# Patient Record
Sex: FEMALE | Race: WHITE | NOT HISPANIC OR LATINO | Employment: FULL TIME
[De-identification: names, ages, dates, MRNs, and addresses within clinical notes are randomized per-mention and may not be internally consistent; named-entity substitution may affect disease eponyms.]

---

## 2017-03-21 ENCOUNTER — RECORDS - HEALTHEAST (OUTPATIENT)
Dept: ADMINISTRATIVE | Facility: OTHER | Age: 47
End: 2017-03-21

## 2017-04-05 ENCOUNTER — HOSPITAL ENCOUNTER (OUTPATIENT)
Dept: MRI IMAGING | Facility: CLINIC | Age: 47
Discharge: HOME | End: 2017-04-05

## 2017-04-05 ENCOUNTER — COMMUNICATION - HEALTHEAST (OUTPATIENT)
Dept: TELEHEALTH | Facility: CLINIC | Age: 47
End: 2017-04-05

## 2017-04-05 DIAGNOSIS — G89.29 CHRONIC HEADACHES: ICD-10-CM

## 2017-04-05 DIAGNOSIS — R51.9 CHRONIC HEADACHES: ICD-10-CM

## 2018-02-27 ENCOUNTER — HOSPITAL ENCOUNTER (OUTPATIENT)
Dept: MAMMOGRAPHY | Facility: HOSPITAL | Age: 48
Discharge: HOME | End: 2018-02-27

## 2018-02-27 DIAGNOSIS — Z12.31 VISIT FOR SCREENING MAMMOGRAM: ICD-10-CM

## 2018-03-27 ENCOUNTER — HOSPITAL ENCOUNTER (OUTPATIENT)
Dept: PHYSICAL MEDICINE AND REHAB | Facility: CLINIC | Age: 48
Discharge: HOME | End: 2018-03-27
Attending: PHYSICAL MEDICINE & REHABILITATION

## 2018-03-27 DIAGNOSIS — M79.18 MYOFASCIAL PAIN: ICD-10-CM

## 2018-03-27 DIAGNOSIS — M99.03 LUMBAR REGION SOMATIC DYSFUNCTION: ICD-10-CM

## 2018-03-27 DIAGNOSIS — M99.05 SOMATIC DYSFUNCTION OF PELVIS REGION: ICD-10-CM

## 2018-03-27 DIAGNOSIS — M99.01 CERVICAL SOMATIC DYSFUNCTION: ICD-10-CM

## 2018-03-27 DIAGNOSIS — M99.04 SACRAL REGION SOMATIC DYSFUNCTION: ICD-10-CM

## 2018-03-27 DIAGNOSIS — M99.02 THORACIC REGION SOMATIC DYSFUNCTION: ICD-10-CM

## 2018-03-27 DIAGNOSIS — M54.2 NECK PAIN: ICD-10-CM

## 2018-03-27 DIAGNOSIS — S16.1XXA NECK STRAIN: ICD-10-CM

## 2018-03-27 DIAGNOSIS — M99.08 RIB CAGE REGION SOMATIC DYSFUNCTION: ICD-10-CM

## 2018-03-27 DIAGNOSIS — M99.00 HEAD REGION SOMATIC DYSFUNCTION: ICD-10-CM

## 2018-03-27 RX ORDER — VERAPAMIL HYDROCHLORIDE 180 MG/1
CAPSULE, EXTENDED RELEASE ORAL
Status: SHIPPED | COMMUNITY
Start: 2018-03-12

## 2018-03-27 RX ORDER — METHOCARBAMOL 500 MG/1
TABLET, FILM COATED ORAL
Qty: 60 TABLET | Refills: 3 | Status: SHIPPED | OUTPATIENT
Start: 2018-03-27

## 2018-03-27 RX ORDER — SIMVASTATIN 20 MG
TABLET ORAL
Status: SHIPPED | COMMUNITY
Start: 2018-03-12

## 2018-03-27 ASSESSMENT — MIFFLIN-ST. JEOR: SCORE: 1532.08

## 2018-03-29 ENCOUNTER — COMMUNICATION - HEALTHEAST (OUTPATIENT)
Dept: PHYSICAL MEDICINE AND REHAB | Facility: CLINIC | Age: 48
End: 2018-03-29

## 2018-04-03 ENCOUNTER — COMMUNICATION - HEALTHEAST (OUTPATIENT)
Dept: PHYSICAL MEDICINE AND REHAB | Facility: CLINIC | Age: 48
End: 2018-04-03

## 2019-01-23 ENCOUNTER — HOSPITAL ENCOUNTER (OUTPATIENT)
Dept: PHYSICAL MEDICINE AND REHAB | Facility: CLINIC | Age: 49
Discharge: HOME | End: 2019-01-23
Attending: PHYSICAL MEDICINE & REHABILITATION

## 2019-01-23 ENCOUNTER — COMMUNICATION - HEALTHEAST (OUTPATIENT)
Dept: PHYSICAL MEDICINE AND REHAB | Facility: CLINIC | Age: 49
End: 2019-01-23

## 2019-01-23 DIAGNOSIS — M54.2 NECK PAIN: ICD-10-CM

## 2019-01-23 DIAGNOSIS — M99.01 SOMATIC DYSFUNCTION OF CERVICAL REGION: ICD-10-CM

## 2019-01-23 DIAGNOSIS — M79.18 MYOFASCIAL PAIN: ICD-10-CM

## 2019-01-23 DIAGNOSIS — M99.07 SOMATIC DYSFUNCTION OF UPPER EXTREMITY: ICD-10-CM

## 2019-01-23 DIAGNOSIS — M99.00 SOMATIC DYSFUNCTION OF HEAD REGION: ICD-10-CM

## 2019-01-23 DIAGNOSIS — M54.6 THORACIC SPINE PAIN: ICD-10-CM

## 2019-01-23 DIAGNOSIS — M99.08 SOMATIC DYSFUNCTION OF RIB REGION: ICD-10-CM

## 2019-01-23 DIAGNOSIS — G47.9 SLEEP DIFFICULTIES: ICD-10-CM

## 2019-01-23 DIAGNOSIS — M99.02 SOMATIC DYSFUNCTION OF THORACIC REGION: ICD-10-CM

## 2019-01-23 RX ORDER — TRAZODONE HYDROCHLORIDE 50 MG/1
TABLET, FILM COATED ORAL
Status: SHIPPED | COMMUNITY
Start: 2019-01-03

## 2019-01-23 ASSESSMENT — MIFFLIN-ST. JEOR: SCORE: 1538.84

## 2019-06-04 ENCOUNTER — COMMUNICATION - HEALTHEAST (OUTPATIENT)
Dept: PHYSICAL MEDICINE AND REHAB | Facility: CLINIC | Age: 49
End: 2019-06-04

## 2019-06-04 DIAGNOSIS — M54.6 THORACIC SPINE PAIN: ICD-10-CM

## 2019-06-04 DIAGNOSIS — M54.2 NECK PAIN: ICD-10-CM

## 2019-06-05 RX ORDER — NABUMETONE 500 MG/1
TABLET, FILM COATED ORAL
Qty: 60 TABLET | Refills: 0 | Status: SHIPPED | OUTPATIENT
Start: 2019-06-05

## 2019-06-29 ENCOUNTER — COMMUNICATION - HEALTHEAST (OUTPATIENT)
Dept: PHYSICAL MEDICINE AND REHAB | Facility: CLINIC | Age: 49
End: 2019-06-29

## 2019-06-29 DIAGNOSIS — M79.18 MYOFASCIAL PAIN: ICD-10-CM

## 2019-06-29 DIAGNOSIS — G47.9 SLEEP DIFFICULTIES: ICD-10-CM

## 2019-07-01 RX ORDER — TIZANIDINE 2 MG/1
TABLET ORAL
Qty: 30 TABLET | Refills: 0 | Status: SHIPPED | OUTPATIENT
Start: 2019-07-01

## 2019-08-20 ENCOUNTER — HOSPITAL ENCOUNTER (OUTPATIENT)
Dept: MAMMOGRAPHY | Facility: CLINIC | Age: 49
Discharge: HOME | End: 2019-08-20

## 2019-08-20 DIAGNOSIS — Z12.31 SCREENING MAMMOGRAM, ENCOUNTER FOR: ICD-10-CM

## 2020-08-28 ENCOUNTER — RECORDS - HEALTHEAST (OUTPATIENT)
Dept: SCHEDULING | Facility: CLINIC | Age: 50
End: 2020-08-28

## 2020-08-28 DIAGNOSIS — Z12.31 VISIT FOR SCREENING MAMMOGRAM: ICD-10-CM

## 2021-01-04 ENCOUNTER — HEALTH MAINTENANCE LETTER (OUTPATIENT)
Age: 51
End: 2021-01-04

## 2021-06-01 VITALS — WEIGHT: 190 LBS | HEIGHT: 68 IN | BODY MASS INDEX: 28.79 KG/M2

## 2021-06-02 VITALS — WEIGHT: 191 LBS | HEIGHT: 68 IN | BODY MASS INDEX: 28.95 KG/M2

## 2021-06-16 NOTE — PROGRESS NOTES
ASSESSMENT: Marielena Ledesma is a 47 y.o. female  with a BMI of Body mass index is 28.8 kg/(m^2). with past medical history significant for vasospasm and mild myocardial infarction who presents today for new patient evaluation of acute flare of neck pain.  Patient likely has a neck strain with a large myofascial pain component.  She likely has a postural imbalance secondary to her job which requires quite a bit of sitting at a computer.  Cervical disc herniation is also in the differential diagnosis though seems less likely at this point in time.  The patient is without significant radicular symptoms or red flag/myelopathic symptoms.  The patient has multiple areas of osteopathic somatic dysfunction as listed below.    NDI: 28%  WHO-5: 16 (the patient is not interested in behavioral health services at this time    PLAN:  A shared decision making model was used.  The patient's values and choices were respected.  The following represents what was discussed and decided upon by the physician and the patient.      1.  DIAGNOSTIC TESTS: No imaging of the cervical spine is necessary at this time.  If the patient fails to have relief with conservative treatment, would consider an MRI of the cervical spine.  Given that her neuro exam is normal and she is without any red flag/myelopathic symptoms, an MRI would not change the treatment plan at this time.  2.  PHYSICAL THERAPY: Recommended that the patient start in physical therapy to work on strengthening, stretching, range of motion exercises.  Physical therapy can also trial a TENS unit.  She needs to work on stretching of the scalene muscles, upper trapezius muscles and pec muscles.  She should strengthen her mid back/rhomboid muscles.  3.  MEDICATIONS: Prednisone 50 mg 1 tablet p.o. daily ×5 days is prescribed today.  Discussed that prednisone can cause jitteriness, so she is encouraged to take it in the morning with food to prevent any stomach upset.  She should refrain  from taking ibuprofen while she takes prednisone.  After she completes the 5 days of prednisone, she can resume taking ibuprofen.  She can take 600 mg (3 over-the-counter tablets) up to 3 times a day as needed for pain.  -Methocarbamol 500 mg 1-2 tablets p.o. nightly is prescribed today.  Discussed that this is a muscle relaxant medication.  This was chosen over gabapentin as the patient feels that her pain is more muscle related as opposed to nerve related.   -  If she failed to have relief of methocarbamol, would consider gabapentin 300 mg titrating up to a maximum dose of 900 mg 3 times a day.  4.  INTERVENTIONS:   -Discussed osteopathic manipulation as an option.  The patient wanted to move forward with this.  The risks and benefits were explained and the patient consented for treatment.  Please see below for the osteopathic manipulation was performed today.  7 areas were treated and the patient reported some mild soreness immediately afterwards.  - No injections are necessary at this time as this is just an acute flare.  If she fails to have relief with conservative treatment, cervical injections can be considered after an MRI is reviewed.  5.  PATIENT EDUCATION:   -The patient was told that the osteopathic manipulation can cause increased pain or improve the pain.  Ideally the pain would be improved, however having increased pain is actually a positive sign as a means of something physiologically changed in her body.  If she does not have any change in her symptoms, this means that the treatment did not make any change and is a less favorable sign.  -Discussed that soreness would be expected after the treatment.  This should resolve within a few days.  She can ice the areas if she has soreness.  She is encouraged to call the clinic if the soreness is concerning to her.  -Encouraged her to have an ergonomic evaluation of her workstation.  An order was provided for this.  An order was also provided for a sit to  stand workstation.  -The patient likely has carpal tunnel syndrome in her hands that is in an early stage.  She would benefit from wearing over-the-counter wrist splints.  It was not discussed at her visit today.  We will have the staff contact her tomorrow when they called to check in and let her know that this is recommended.  6.  FOLLOW-UP:   Nurse navigation will contact the patient tomorrow and then again in a week.  She is encouraged to contact the physician if he has any questions, concerns, or any significant worsening of her symptoms.        SUBJECTIVE:  Marielena Ledesma  Is a 47 y.o. female who presents today for new patient evaluation of an acute flare of neck pain.  The patient reports that she has had several episodes of acute neck pain over the last 6 months.  She reports that with each episode, it seems to be more intense and be harder to treat so she wanted to come in to be seen.  The patient reports that on Thursday and Friday of last week she was repainting her house and moving furniture.  She reports that on Saturday she can hardly move her neck.  She reports that the weekend before she is also painting her in-laws house, however she was not moving furniture and she thinks that this difference may have made the difference in her pain between the 2 events.    Her neck pain is located right at the cervicothoracic junction.  She describes it as a burning [place] of constant pain.  She can get some pain radiating out to the deltoids and pain in the shoulder blades.  Occasionally pain can shoot caudally down the thoracic spine.  She really denies any shooting pain going down the arms.  No numbness tingling or weakness in the arms.  Her pain is worse with turning her neck.  With turning her neck she gets a sharp shooting pain.  The pain also seems to be better in the morning and then worse as the day goes on.  If she can turn her entire torso, this feels better for the pain.  She has been taking ibuprofen  3-4 tablets twice a day.  She is unsure if this is helping or not.    She does see the chiropractor, however she does not like the idea of the chiropractor twisting her neck.  She has not had any other treatment such as physical therapy, injections and has no history of neck surgery.    Medications: Reviewed and correct in the chart.    Allergies: Reviewed and NKDA per patient.    PMH: Reviewed and significant for vasospasms with a minor myocardial infarction.    PSH: Reviewed and significant for hysterectomy for uterine fibroids.    Family History: Reviewed and significant for breast cancer, heart disease, stroke.    Social History: The patient is  and works as a  for [employer].  She drinks alcohol socially on the weekends.  She denies any tobacco or illicit drug use.    ROS: As noted for poor sleep quality specifically negative for dysphagia, imbalance, fine motor skill difficulties, bowel/bladder dysfunction, fevers,chills, appetite changes, unexplained weight loss.   Otherwise 13 systems reviewed are negative.  Please see the patient's intake questionnaire from today for details.      OBJECTIVE:  PHYSICAL EXAMINATION:  CONSTITUTIONAL:  Vital signs as above.  No acute distress.  The patient is well nourished and well groomed.  PSYCHIATRIC:  The patient is awake, alert, oriented to person, place, time and answering questions appropriately with clear speech.    HEENT:  Sclera are non-injected.  Extraocular muscles are intact. .  Moist oral mucosa.  SKIN:  Skin over the face, bilateral upper extremities, and posterior torso is clean, dry, intact without rashes.  LYMPH NODES:  No palpable or tender anterior/posterior cervical, submandibular, or supraclavicular lymph nodes.    MUSCLE STRENGTH:  5/5 strength for the bilateral shoulder abductors, elbow flexors/extensors, wrist extensors, finger flexors/abductors.  NEURO:    2/4 symmetric biceps, brachioradialis, triceps reflexes bilaterally.   Sensation to pinprick is impaired in multiple digits of both upper extremities, though not all of the digits in each hand.  There is no specific dermatomal pattern.  Negative Carnes's bilaterally.    VASCULAR:  2/4 radial pulses bilaterally.  Warm upper limbs bilaterally.  Capillary refill in the upper extremities is less than 1 second.  MUSCULOSKELETAL: Patient has very restricted range of motion of the cervical spine with flexion extension.  She reports with both of these motions, worse with extension.  She has restricted range of motion of the cervical spine with side bending and rotation.  She reports pain with both of these motions.  She has tenderness to palpation over the lateral cervical paraspinal muscles and over the upper trapezius muscles with hypertonicity of the upper trapezius muscles.  She also has tenderness over the upper thoracic paraspinal muscles bilaterally.    RESULTS: No imaging.    OSTEOPATHIC STRUCTURAL EXAM:  Symmetric iliac crests.  Negative standing flexion test.  Lumbar spine: L4-5 flexed, rotated/side bent left  Sacrum: Right on right forward sacral torsion  Innominate: Anterior/inferior right, posterior/superior left  Thoracic spine: T7-10 flexed, rotated/side bent left T1-3 flexed, rotated/side bent left  Rib cage: First rib elevated on the left.  Cervical spine: C4-6 flexed, rotated/side bent right  Head/OA joint: Side bent right/rotated left    OMT:  TREATMENTS IN PARENTHESES  Lumbar spine: L4-5 flexed, rotated/side bent left (Muscle energy, patient in lateral recumbent)  Sacrum: Right on right forward sacral torsion (Muscle energy, patient in lateral recumbent)  Innominate: Anterior/inferior right, posterior/superior left (Muscle energy with the patient supine).  Thoracic spine: T7-10 flexed, rotated/side bent left.  (Myofascial release, patient supine) T1-3 flexed, rotated/side bent left (Myofascial release, patient supine)  Rib cage: First rib elevated on the left.  (Myofascial  release, patient supine)  Cervical spine: C4-6 flexed, rotated/side bent right (Muscle energy with the patient supine).  Head/OA joint: Side bent right/rotated left (Muscle energy with the patient supine).

## 2021-06-23 NOTE — TELEPHONE ENCOUNTER
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 States this is the same type of pain she was seen for in March 2018. She is hoping to be seen today for some OMT. Her provider, Dr. Burroughs, is out of office and does not have availability until Tuesday. Discussed with Bryan Cabral DO. He will see patient today.

## 2021-06-23 NOTE — PROGRESS NOTES
Assessment/Plan:      Diagnoses and all orders for this visit:    Neck pain  -     nabumetone (RELAFEN) 500 MG tablet  Dispense: 60 tablet; Refill: 0    Myofascial pain  -     tiZANidine (ZANAFLEX) 2 MG tablet  Dispense: 30 tablet; Refill: 0    Thoracic spine pain  -     nabumetone (RELAFEN) 500 MG tablet  Dispense: 60 tablet; Refill: 0    Sleep difficulties  -     tiZANidine (ZANAFLEX) 2 MG tablet  Dispense: 30 tablet; Refill: 0    Somatic dysfunction of head region    Somatic dysfunction of cervical region    Somatic dysfunction of rib region    Somatic dysfunction of upper extremity    Somatic dysfunction of thoracic region        Assessment: Pleasant 48 y.o. female with a history of vasospasm resulting in mild myocardial infarction cleared by her cardiologist with no restrictions on medications with:    1.  Acute flare of cervical spine and upper thoracic spine pain for the past 3 days after looking up while painting.  Consistent with myofascial pain in the cervical spine upper thoracic spine and mechanical pain.  She has some interesting symptoms of mild blurred vision and worsening leakage of urine with no myelopathic findings on exam unsure of the etiology of these random symptoms.    2.  Poor sleep related to pain.    3.  Somatic dysfunctions of the cranium, cervical spine, rib cage, upper extremities, thoracic spine that contribute to the patient's pain complaints.    4.  She does have a history of migraines.          Discussion:    1. *I discussed the diagnosis and treatment options.  Likely related to positional mechanical factors and myofascial pain resulting in neck and upper thoracic spine pain flare.  We discussed cervical myelopathy given her normal exam not likely an issue at this time.  We discussed options of medications given this is only been a couple of days along with manual medicine.  She has not had any imaging up to this point.    2.  Trial tizanidine for poor sleep and myofascial  "pain.    3.  We will give a short course of nabumetone as needed for an anti-inflammatory and pain.  She does take Aleve or Advil intermittently as needed and has no restrictions from cardiology.  We did discuss the risks of this medication.    4.  Trial OMT today.  Did have a treatment beginning of last year that was helpful for her and she would like an OMT treatment today and this is could be beneficial given the acuity of her pain.  She agrees to proceed.  Please see attached procedure note.    5.  Follow-up in 1 week for reevaluation      It was our pleasure caring for your patient today, if there any questions or concerns please do not hesitate to contact us.      Subjective:   Patient ID: Marielena Ledesma is a 48 y.o. female.    History of Present Illness: Patient presents for evaluation of cervical spine pain upper thoracic spine pain.  Previous patient of Dr. Burroughs seen today for acute pain flare.  This started on Sunday.  She was doing a lot of painting at home looking up and mostly to the left for an extended period of time painting.  Began having severe pain in the cervical thoracic spine upper thoracic spine and radiating up to the suboccipital region.  This pain is different than what she is experienced in the past.  This feels like a sharp pain such as a \"bone spur\" in the upper thoracic spine and lower cervical spine.  Worse with any turning of her head or any movement.  Pain is a 5/10 today 8/10 at worst.  She has tried Aleve which offers minimal benefit.  She does get some pain down to the elbows bilaterally occasionally.  No numbness or tingling in her hands.  Has not had any treatments over the past 3 days.  She has tried methocarbamol in the past with no improvement in symptoms and she also has tried Flexeril.  Was not helpful.      Imaging: None Available    Review of Systems: She notes some mild increased leakage of urine over baseline she has headaches which is normal for her.  She has had " some generalized blurred vision since Sunday as well which is relatively mild more of difficulty focusing.  No numbness or tingling in the arms or legs no coordination difficulties.  No weakness in the arms.  No dizziness nausea vomiting or balance changes.    History reviewed. No pertinent past medical history.  She does have a history of vasospasm of the coronary artery been cleared by her cardiologist with no medication restrictions.    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    The following portions of the patient's history were reviewed and updated as appropriate: allergies, current medications, past family history, past medical history, past social history, past surgical history and problem list.      Objective:   Physical Exam:    Vitals:    01/23/19 0925   BP: 127/60   Pulse: (!) 58       General: Alert and oriented with normal affect. Attention, knowledge, memory, and language are intact. No acute distress.   Eyes: Sclerae are clear.  Respirations: Unlabored. CV: No lower extremity edema.   Gait:  Nonantalgic  Mild decreased cervical extension.  Pain upper thoracic spine.  No significant tenderness cervical spine.  Significant tenderness over the T2 and T3 spinous process and paraspinal tissues.  Sensation is intact to light touch throughout the upper  extremities.  Reflexes are 2+ and symmetric in the biceps triceps and brachioradialis with negative Hoffmans. 2+ patellar and Achilles  .    Manual muscle testing reveals:  Right /Left out of 5  5/5 shoulder abductors  5/5 elbow flexors  5/5 elbow extensors  5/5 wrist extensors  5/5 interosseus  5/5 finger flexors       Structural exam: Cranium: Left cranial torsion, OA sidebent left rotated right cervical spine: C2 rotated left side bent left, C3 rotated right sidebent right extended, Rib cage: Rib one elevated on the left. Thoracic spine: T1 rotated right sidebent right, T2 and T3 rotated left side bent left extended.  upper Extremities:  myofascial restrictions of the bilat upper trap, infraspinatus/parascapular muscles.  Bilateral pectoral restrictions.    Procedure:    After discussing the risks and benefits of osteopathic manipulative medicine, verbal consent was obtained. The somatic dysfunctions listed above were treated with the following techniques: Cranium: Cranial indirect technique, VSD, and muscle energy for the OA. Cervical spine: Muscle energy, still technique, FPR, myofascial release, BLT, and soft tissue techniques. Rib cage: Myofascial release and FPR. Thoracic spine: Myofascial release, BLT, seated muscle energy, gentle HVLA.   Upper Exrtremity: MFR, FPR, BLT.  The patient tolerated the procedure well and had improved range of motion in all areas treated prior to leaving the clinic.

## 2021-10-23 ENCOUNTER — HEALTH MAINTENANCE LETTER (OUTPATIENT)
Age: 51
End: 2021-10-23

## 2022-02-12 ENCOUNTER — HEALTH MAINTENANCE LETTER (OUTPATIENT)
Age: 52
End: 2022-02-12

## 2022-10-09 ENCOUNTER — HEALTH MAINTENANCE LETTER (OUTPATIENT)
Age: 52
End: 2022-10-09

## 2023-03-25 ENCOUNTER — HEALTH MAINTENANCE LETTER (OUTPATIENT)
Age: 53
End: 2023-03-25

## 2024-05-03 ENCOUNTER — HOSPITAL ENCOUNTER (EMERGENCY)
Facility: CLINIC | Age: 54
Discharge: HOME | End: 2024-05-03
Attending: EMERGENCY MEDICINE | Admitting: EMERGENCY MEDICINE
Payer: COMMERCIAL

## 2024-05-03 ENCOUNTER — APPOINTMENT (OUTPATIENT)
Dept: CT IMAGING | Facility: CLINIC | Age: 54
End: 2024-05-03
Attending: EMERGENCY MEDICINE
Payer: COMMERCIAL

## 2024-05-03 VITALS
TEMPERATURE: 98.3 F | BODY MASS INDEX: 29.62 KG/M2 | OXYGEN SATURATION: 99 % | HEIGHT: 69 IN | DIASTOLIC BLOOD PRESSURE: 101 MMHG | WEIGHT: 200 LBS | HEART RATE: 60 BPM | SYSTOLIC BLOOD PRESSURE: 148 MMHG | RESPIRATION RATE: 11 BRPM

## 2024-05-03 DIAGNOSIS — R10.10 UPPER ABDOMINAL PAIN: ICD-10-CM

## 2024-05-03 DIAGNOSIS — R91.8 PULMONARY NODULES: ICD-10-CM

## 2024-05-03 DIAGNOSIS — R07.9 CHEST PAIN, UNSPECIFIED TYPE: ICD-10-CM

## 2024-05-03 DIAGNOSIS — I10 HYPERTENSION, UNSPECIFIED TYPE: ICD-10-CM

## 2024-05-03 DIAGNOSIS — R51.9 NONINTRACTABLE EPISODIC HEADACHE, UNSPECIFIED HEADACHE TYPE: ICD-10-CM

## 2024-05-03 LAB
ALBUMIN SERPL BCG-MCNC: 4.7 G/DL (ref 3.5–5.2)
ALP SERPL-CCNC: 56 U/L (ref 40–150)
ALT SERPL W P-5'-P-CCNC: 21 U/L (ref 0–50)
ANION GAP SERPL CALCULATED.3IONS-SCNC: 11 MMOL/L (ref 7–15)
AST SERPL W P-5'-P-CCNC: 28 U/L (ref 0–45)
ATRIAL RATE - MUSE: 60 BPM
BASOPHILS # BLD AUTO: 0.1 10E3/UL (ref 0–0.2)
BASOPHILS NFR BLD AUTO: 1 %
BILIRUB SERPL-MCNC: 0.5 MG/DL
BUN SERPL-MCNC: 5.9 MG/DL (ref 6–20)
CALCIUM SERPL-MCNC: 9.9 MG/DL (ref 8.6–10)
CHLORIDE SERPL-SCNC: 100 MMOL/L (ref 98–107)
CREAT SERPL-MCNC: 0.7 MG/DL (ref 0.51–0.95)
D DIMER PPP FEU-MCNC: 0.44 UG/ML FEU (ref 0–0.5)
DEPRECATED HCO3 PLAS-SCNC: 26 MMOL/L (ref 22–29)
DIASTOLIC BLOOD PRESSURE - MUSE: NORMAL MMHG
EGFRCR SERPLBLD CKD-EPI 2021: >90 ML/MIN/1.73M2
EOSINOPHIL # BLD AUTO: 0.2 10E3/UL (ref 0–0.7)
EOSINOPHIL NFR BLD AUTO: 4 %
ERYTHROCYTE [DISTWIDTH] IN BLOOD BY AUTOMATED COUNT: 11.9 % (ref 10–15)
GLUCOSE SERPL-MCNC: 102 MG/DL (ref 70–99)
HCT VFR BLD AUTO: 41.1 % (ref 35–47)
HGB BLD-MCNC: 14.3 G/DL (ref 11.7–15.7)
HOLD SPECIMEN: NORMAL
HOLD SPECIMEN: NORMAL
IMM GRANULOCYTES # BLD: 0 10E3/UL
IMM GRANULOCYTES NFR BLD: 0 %
INTERPRETATION ECG - MUSE: NORMAL
LIPASE SERPL-CCNC: 44 U/L (ref 13–60)
LYMPHOCYTES # BLD AUTO: 1.5 10E3/UL (ref 0.8–5.3)
LYMPHOCYTES NFR BLD AUTO: 28 %
MCH RBC QN AUTO: 31.2 PG (ref 26.5–33)
MCHC RBC AUTO-ENTMCNC: 34.8 G/DL (ref 31.5–36.5)
MCV RBC AUTO: 90 FL (ref 78–100)
MONOCYTES # BLD AUTO: 0.3 10E3/UL (ref 0–1.3)
MONOCYTES NFR BLD AUTO: 6 %
NEUTROPHILS # BLD AUTO: 3.2 10E3/UL (ref 1.6–8.3)
NEUTROPHILS NFR BLD AUTO: 61 %
NRBC # BLD AUTO: 0 10E3/UL
NRBC BLD AUTO-RTO: 0 /100
NT-PROBNP SERPL-MCNC: 152 PG/ML (ref 0–900)
P AXIS - MUSE: 70 DEGREES
PLATELET # BLD AUTO: 286 10E3/UL (ref 150–450)
POTASSIUM SERPL-SCNC: 4.1 MMOL/L (ref 3.4–5.3)
PR INTERVAL - MUSE: 162 MS
PROT SERPL-MCNC: 7.8 G/DL (ref 6.4–8.3)
QRS DURATION - MUSE: 90 MS
QT - MUSE: 454 MS
QTC - MUSE: 454 MS
R AXIS - MUSE: 9 DEGREES
RBC # BLD AUTO: 4.58 10E6/UL (ref 3.8–5.2)
SODIUM SERPL-SCNC: 137 MMOL/L (ref 135–145)
SYSTOLIC BLOOD PRESSURE - MUSE: NORMAL MMHG
T AXIS - MUSE: 44 DEGREES
TROPONIN T SERPL HS-MCNC: <6 NG/L
VENTRICULAR RATE- MUSE: 60 BPM
WBC # BLD AUTO: 5.2 10E3/UL (ref 4–11)

## 2024-05-03 PROCEDURE — 82040 ASSAY OF SERUM ALBUMIN: CPT | Performed by: EMERGENCY MEDICINE

## 2024-05-03 PROCEDURE — 83690 ASSAY OF LIPASE: CPT | Performed by: EMERGENCY MEDICINE

## 2024-05-03 PROCEDURE — 250N000011 HC RX IP 250 OP 636: Performed by: EMERGENCY MEDICINE

## 2024-05-03 PROCEDURE — 83880 ASSAY OF NATRIURETIC PEPTIDE: CPT | Performed by: EMERGENCY MEDICINE

## 2024-05-03 PROCEDURE — 250N000013 HC RX MED GY IP 250 OP 250 PS 637: Performed by: EMERGENCY MEDICINE

## 2024-05-03 PROCEDURE — 93005 ELECTROCARDIOGRAM TRACING: CPT

## 2024-05-03 PROCEDURE — 99285 EMERGENCY DEPT VISIT HI MDM: CPT | Mod: 25

## 2024-05-03 PROCEDURE — 84484 ASSAY OF TROPONIN QUANT: CPT | Performed by: EMERGENCY MEDICINE

## 2024-05-03 PROCEDURE — 96374 THER/PROPH/DIAG INJ IV PUSH: CPT | Mod: 59

## 2024-05-03 PROCEDURE — 85004 AUTOMATED DIFF WBC COUNT: CPT | Performed by: EMERGENCY MEDICINE

## 2024-05-03 PROCEDURE — 84155 ASSAY OF PROTEIN SERUM: CPT | Performed by: EMERGENCY MEDICINE

## 2024-05-03 PROCEDURE — 36415 COLL VENOUS BLD VENIPUNCTURE: CPT | Performed by: EMERGENCY MEDICINE

## 2024-05-03 PROCEDURE — 85379 FIBRIN DEGRADATION QUANT: CPT | Performed by: EMERGENCY MEDICINE

## 2024-05-03 PROCEDURE — 71260 CT THORAX DX C+: CPT

## 2024-05-03 RX ORDER — FAMOTIDINE 20 MG/1
20 TABLET, FILM COATED ORAL ONCE
Status: COMPLETED | OUTPATIENT
Start: 2024-05-03 | End: 2024-05-03

## 2024-05-03 RX ORDER — MAGNESIUM HYDROXIDE/ALUMINUM HYDROXICE/SIMETHICONE 120; 1200; 1200 MG/30ML; MG/30ML; MG/30ML
30 SUSPENSION ORAL ONCE
Status: COMPLETED | OUTPATIENT
Start: 2024-05-03 | End: 2024-05-03

## 2024-05-03 RX ORDER — KETOROLAC TROMETHAMINE 15 MG/ML
15 INJECTION, SOLUTION INTRAMUSCULAR; INTRAVENOUS ONCE
Status: COMPLETED | OUTPATIENT
Start: 2024-05-03 | End: 2024-05-03

## 2024-05-03 RX ORDER — IOPAMIDOL 755 MG/ML
72 INJECTION, SOLUTION INTRAVASCULAR ONCE
Status: COMPLETED | OUTPATIENT
Start: 2024-05-03 | End: 2024-05-03

## 2024-05-03 RX ORDER — NITROGLYCERIN 0.4 MG/1
0.4 TABLET SUBLINGUAL EVERY 5 MIN PRN
Status: DISCONTINUED | OUTPATIENT
Start: 2024-05-03 | End: 2024-05-03 | Stop reason: HOSPADM

## 2024-05-03 RX ORDER — ACETAMINOPHEN 500 MG
1000 TABLET ORAL ONCE
Status: COMPLETED | OUTPATIENT
Start: 2024-05-03 | End: 2024-05-03

## 2024-05-03 RX ADMIN — IOPAMIDOL 72 ML: 755 INJECTION, SOLUTION INTRAVENOUS at 10:46

## 2024-05-03 RX ADMIN — ALUMINUM HYDROXIDE, MAGNESIUM HYDROXIDE, AND DIMETHICONE 30 ML: 200; 20; 200 SUSPENSION ORAL at 09:41

## 2024-05-03 RX ADMIN — NITROGLYCERIN 0.4 MG: 0.4 TABLET SUBLINGUAL at 09:35

## 2024-05-03 RX ADMIN — FAMOTIDINE 20 MG: 20 TABLET ORAL at 09:40

## 2024-05-03 RX ADMIN — ACETAMINOPHEN 1000 MG: 500 TABLET, FILM COATED ORAL at 12:39

## 2024-05-03 RX ADMIN — KETOROLAC TROMETHAMINE 15 MG: 15 INJECTION, SOLUTION INTRAMUSCULAR; INTRAVENOUS at 09:28

## 2024-05-03 ASSESSMENT — ACTIVITIES OF DAILY LIVING (ADL)
ADLS_ACUITY_SCORE: 35

## 2024-05-03 ASSESSMENT — COLUMBIA-SUICIDE SEVERITY RATING SCALE - C-SSRS
1. IN THE PAST MONTH, HAVE YOU WISHED YOU WERE DEAD OR WISHED YOU COULD GO TO SLEEP AND NOT WAKE UP?: NO
6. HAVE YOU EVER DONE ANYTHING, STARTED TO DO ANYTHING, OR PREPARED TO DO ANYTHING TO END YOUR LIFE?: NO
2. HAVE YOU ACTUALLY HAD ANY THOUGHTS OF KILLING YOURSELF IN THE PAST MONTH?: NO

## 2024-05-03 NOTE — DISCHARGE INSTRUCTIONS
Discharge Instructions  Abdominal Pain    Abdominal pain (belly pain) can be caused by many things. Your evaluation today does not show the exact cause for your pain. Your provider today has decided that it is unlikely your pain is due to a life threatening problem, or a problem requiring surgery or hospital admission. Sometimes those problems cannot be found right away, so it is very important that you follow up as directed.  Sometimes only the changes which occur over time allow the cause of your pain to be found.    Generally, every Emergency Department visit should have a follow-up clinic visit with either a primary or a specialty clinic/provider. Please follow-up as instructed by your emergency provider today. With abdominal pain, we often recommend very close follow-up, such as the following day.    ADULTS:  Return to the Emergency Department right away if:    You get an oral temperature above 102oF or as directed by your provider.  You have blood in your stools. This may be bright red or appear as black, tarry stools.    You keep vomiting (throwing up) or cannot drink liquids.  You see blood when you vomit.   You cannot have a bowel movement or you cannot pass gas.  Your stomach gets bloated or bigger.  Your skin or the whites of your eyes look yellow.  You faint.  You have bloody, frequent or painful urination (peeing).  You have new symptoms or anything that worries you.    CHILDREN:  Return to the Emergency Department right away if your child has any of the above-listed symptoms or the following:    Pushes your hand away or screams/cries when his/her belly is touched.  You notice your child is very fussy or weak.  Your child is very tired and is too tired to eat or drink.  Your child is dehydrated.  Signs of dehydration can be:  Significant change in the amount of wet diapers/urine.  Your infant or child starts to have dry mouth and lips, or no saliva (spit) or tears.    PREGNANT WOMEN:  Return to the  Emergency Department right away if you have any of the above-listed symptoms or the following:    You have bleeding, leaking fluid or passing tissue from the vagina.  You have worse pain or cramping, or pain in your shoulder or back.  You have vomiting that will not stop.  You have a temperature of 100oF or more.  Your baby is not moving as much as usual.  You faint.  You get a bad headache with or without eye problems and abdominal pain.  You have a seizure.  You have unusual discharge from your vagina and abdominal pain.    Abdominal pain is pretty common during pregnancy.  Your pain may or may not be related to your pregnancy. You should follow-up closely with your OB provider so they can evaluate you and your baby.  Until you follow-up with your regular provider, do the following:     Avoid sex and do not put anything in your vagina.  Drink clear fluids.  Only take medications approved by your provider.    MORE INFORMATION:    Appendicitis:  A possible cause of abdominal pain in any person who still has their appendix is acute appendicitis. Appendicitis is often hard to diagnose.  Testing does not always rule out early appendicitis or other causes of abdominal pain. Close follow-up with your provider and re-evaluations may be needed to figure out the reason for your abdominal pain.    Follow-up:  It is very important that you make an appointment with your clinic and go to the appointment.  If you do not follow-up with your primary provider, it may result in missing an important development which could result in permanent injury or disability and/or lasting pain.  If there is any problem keeping your appointment, call your provider or return to the Emergency Department.    Medications:  Take your medications as directed by your provider today.  Before using over-the-counter medications, ask your provider and make sure to take the medications as directed.  If you have any questions about medications, ask your  "provider.    Diet:  Resume your normal diet as much as possible, but do not eat fried, fatty or spicy foods while you have pain.  Do not drink alcohol or have caffeine.  Do not smoke tobacco.    Probiotics: If you have been given an antibiotic, you may want to also take a probiotic pill or eat yogurt with live cultures. Probiotics have \"good bacteria\" to help your intestines stay healthy. Studies have shown that probiotics help prevent diarrhea (loose stools) and other intestine problems (including C. diff infection) when you take antibiotics. You can buy these without a prescription in the pharmacy section of the store.     If you were given a prescription for medicine here today, be sure to read all of the information (including the package insert) that comes with your prescription.  This will include important information about the medicine, its side effects, and any warnings that you need to know about.  The pharmacist who fills the prescription can provide more information and answer questions you may have about the medicine.  If you have questions or concerns that the pharmacist cannot address, please call or return to the Emergency Department.       Remember that you can always come back to the Emergency Department if you are not able to see your regular provider in the amount of time listed above, if you get any new symptoms, or if there is anything that worries you.     Discharge Instructions  Hypertension - High Blood Pressure    During you visit to the Emergency Department, your blood pressure was higher than the recommended blood pressure.  This may be related to stress, pain, medication or other temporary conditions. In these cases, your blood pressure may return to normal on its own. If you have a history of high blood pressure, you may need to have your provider adjust your medications. Sometimes, your high measurement here may indicate that you have developed high blood pressure that will stay high " unless it is treated. As a general rule, high blood pressure causes problems over years rather than days, weeks, or months. So, while it is important to treat blood pressure, it is rarely important to treat blood pressure immediately. Occasionally we will begin a medication in the Emergency Department; more often we will recommend close follow-up for medications with a primary doctor/clinic.    Generally, every Emergency Department visit should have a follow-up clinic visit with either a primary or a specialty clinic/provider. Please follow-up as instructed by your emergency provider today.    Return to the Emergency Department if you start to have:  A severe headache.  Chest pain.  Shortness of breath.  Weakness or numbness that affects one part of the body.  Confusion.  Vision changes.  Significant swelling of legs and/or eyes.  A reaction to any medication started in the Emergency Department.    What can I do to help myself?  Avoid alcohol.  Take any blood pressure medicine that you are prescribed.  Get a good night s sleep.  Lower your salt intake.  Exercise.  Lose weight.  Manage stress.  See your doctor regularly    If blood pressure medication was started in the Emergency Department:  The medicine may not have an immediate effect. The body and brain determine what blood pressure you have. The medicine s job is to retrain the body s  thermostat  to a lower blood pressure.  You will need to follow up with your provider to see how this medicine is working for you.  If you were given a prescription for medicine here today, be sure to read all of the information (including the package insert) that comes with your prescription.  This will include important information about the medicine, its side effects, and any warnings that you need to know about.  The pharmacist who fills the prescription can provide more information and answer questions you may have about the medicine.  If you have questions or concerns that  the pharmacist cannot address, please call or return to the Emergency Department.   Remember that you can always come back to the Emergency Department if you are not able to see your regular provider in the amount of time listed above, if you get any new symptoms, or if there is anything that worries you.     Discharge Instructions  Headache    You were seen today for a headache. Headaches may be caused by many different things such as muscle tension, sinus inflammation, anxiety and stress, having too little sleep, too much alcohol, some medical conditions or injury. You may have a migraine, which is caused by changes in the blood vessels in your head.  At this time your provider does not find that your headache is a sign of anything dangerous or life-threatening.  However, sometimes the signs of serious illness do not show up right away.      Generally, every Emergency Department visit should have a follow-up clinic visit with either a primary or a specialty clinic/provider. Please follow-up as instructed by your emergency provider today.    Return to the Emergency Department if:  You get a new fever of 100.4 F or higher.  Your headache gets much worse.  You get a stiff neck with your headache.  You get a new headache that is significantly different or worse than headaches you have had before.  You are vomiting (throwing up) and cannot keep food or water down.  You have blurry or double vision or other problems with your eyes.  You have a new weakness on one side of your body.  You have difficulty with balance which is new.  You or your family thinks you are confused.  You have a seizure.    What can I do to help myself?  Pain medications - You may take a pain medication such as Tylenol  (acetaminophen), Advil , Motrin  (ibuprofen) or Aleve  (naproxen).  Take a pain reliever as soon as you notice symptoms.  Starting medications as soon as you start to have symptoms may lessen the amount of pain you have.  Relaxing  in a quiet, dark room may help.  Get enough sleep and eat meals regularly.  You may need to watch for certain foods or other things which may trigger your headaches.  Keeping a journal of your headaches and possible triggers may help you and your primary provider to identify things which you should avoid which may be causing your headaches.  If you were given a prescription for medicine here today, be sure to read all of the information (including the package insert) that comes with your prescription.  This will include important information about the medicine, its side effects, and any warnings that you need to know about.  The pharmacist who fills the prescription can provide more information and answer questions you may have about the medicine.  If you have questions or concerns that the pharmacist cannot address, please call or return to the Emergency Department.   Remember that you can always come back to the Emergency Department if you are not able to see your regular provider in the amount of time listed above, if you get any new symptoms, or if there is anything that worries you.

## 2024-05-03 NOTE — ED TRIAGE NOTES
Patient reports chest pain and pain in the central abdomen. Pt has history of vasospasms that have lead to a heart attack. Patient reports symptoms feel similar to previously.

## 2024-05-03 NOTE — ED PROVIDER NOTES
"  Emergency Department Note      History of Present Illness     Chief Complaint  Chest Pain    HPI  Marielena Ledesma is a 53 year old female with a history of coronary vasospasm, myocardial infraction, Grave's disease, hypertension, and hyperlipidemia who presents with chest pain. She initially experienced an intermittent burning sensation in her chest for the past month, but it has become more constant over the past couple of days. Additionally, she developed some slight posterior pressure during this time frame, which felt similar to a headache she had when during her myocardial infarction. Other recent symptoms mentioned include shortness of breath, \"throat pressure\", phlegm in her throat, nausea, and chills. She mentions feeling febrile, but has not measured her temperature. She denies recent long distance trips, surgeries, prolonged periods of immobilization, or hormone therapy. She did experience some leg pain about a month ago and had an elevated Ddimer when she was seen for it, but imaging did not show any clots. However, she was still given 1 dose of Lovenox. She further denies visual disturbance, vomiting, hematochezia, or melena.      Independent Historian  None    Review of External Notes  No previous ECGs available for review.  No recent office visits this year available for review.     Past Medical History   Medical History and Problem List  Subserous leiomyoma of uterus   Coronary vasospasm   Insomnia   Weight disorder   Hypothyroidism   Anxiety   HTN   Grave's disease   GERD   Migraine w/ aura   HLD   MDD   Prinzmetal angina   MI     Medications  Robaxin   Relafen   Zocor   Zanaflex   Desyrel   Verelan   Sudafed   Flexeril     Surgical History   Vaginal hysterectomy     Physical Exam   Patient Vitals for the past 24 hrs:   BP Temp Pulse Resp SpO2 Height Weight   05/03/24 1040 (!) 162/88 -- 60 -- 93 % -- --   05/03/24 1030 (!) 162/90 -- 57 11 97 % -- --   05/03/24 1015 (!) 171/92 -- 64 11 97 % -- -- " "  05/03/24 1000 (!) 161/94 -- 60 16 92 % -- --   05/03/24 0952 -- -- 63 15 92 % -- --   05/03/24 0951 -- -- 57 12 91 % -- --   05/03/24 0950 (!) 157/94 -- 57 -- 93 % -- --   05/03/24 0945 (!) 137/91 -- 70 -- 94 % -- --   05/03/24 0943 -- -- 69 11 97 % -- --   05/03/24 0942 135/82 -- 71 10 95 % -- --   05/03/24 0940 135/82 -- 73 19 94 % -- --   05/03/24 0927 (!) 159/94 -- -- -- 99 % -- --   05/03/24 0912 (!) 169/104 -- -- -- -- -- --   05/03/24 0908 (!) 178/102 98.3  F (36.8  C) -- -- -- -- --   05/03/24 0906 -- -- 67 18 99 % 1.753 m (5' 9\") 90.7 kg (200 lb)     Physical Exam  General: Adult female sitting upright  Eyes: PERRL, Conjunctive within normal limits  ENT: Moist mucous membranes, oropharynx clear.   CV: Normal S1S2, no murmur, rub or gallop. Regular rate and rhythm.  Radial pulses intact bilaterally.  No appreciable JVD.  Resp: Clear to auscultation bilaterally, no wheezes, rales or rhonchi. Normal respiratory effort.  GI: Abdomen is soft and nondistended.  Mild generalized upper abdominal tenderness to palpation.  No palpable masses. No rebound or guarding.  MSK: No edema. Nontender. Normal active range of motion.  No calf asymmetry appreciated.  Skin: Warm and dry. No rashes or lesions or ecchymoses on visible skin.  Neuro: Alert and oriented. Responds appropriately to all questions and commands. No focal findings appreciated. Normal muscle tone.  Psych: Normal mood and affect. Pleasant.     Diagnostics   Lab Results   Labs Ordered and Resulted from Time of ED Arrival to Time of ED Departure   COMPREHENSIVE METABOLIC PANEL - Abnormal       Result Value    Sodium 137      Potassium 4.1      Carbon Dioxide (CO2) 26      Anion Gap 11      Urea Nitrogen 5.9 (*)     Creatinine 0.70      GFR Estimate >90      Calcium 9.9      Chloride 100      Glucose 102 (*)     Alkaline Phosphatase 56      AST 28      ALT 21      Protein Total 7.8      Albumin 4.7      Bilirubin Total 0.5     LIPASE - Normal    Lipase 44   "   TROPONIN T, HIGH SENSITIVITY - Normal    Troponin T, High Sensitivity <6     D DIMER QUANTITATIVE - Normal    D-Dimer Quantitative 0.44     NT PROBNP INPATIENT - Normal    N terminal Pro BNP Inpatient 152     CBC WITH PLATELETS AND DIFFERENTIAL    WBC Count 5.2      RBC Count 4.58      Hemoglobin 14.3      Hematocrit 41.1      MCV 90      MCH 31.2      MCHC 34.8      RDW 11.9      Platelet Count 286      % Neutrophils 61      % Lymphocytes 28      % Monocytes 6      % Eosinophils 4      % Basophils 1      % Immature Granulocytes 0      NRBCs per 100 WBC 0      Absolute Neutrophils 3.2      Absolute Lymphocytes 1.5      Absolute Monocytes 0.3      Absolute Eosinophils 0.2      Absolute Basophils 0.1      Absolute Immature Granulocytes 0.0      Absolute NRBCs 0.0       Imaging  CT Aortic Survey w Contrast   Final Result   IMPRESSION:   1.  No aortic aneurysm or dissection. No acute findings in the chest,   abdomen and pelvis.   2.  Few small pulmonary nodules measuring up to 5 mm. See follow-up   guidelines.      REFERENCE:   Guidelines for Management of Incidental Pulmonary Nodules Detected on   CT Images: From the Fleischner Society 2017.    Guidelines apply to incidental nodules in patients who are 35 years or   older.   Guidelines do not apply to lung cancer screening, patients with   immunosuppression, or patients with known primary cancer.      MULTIPLE NODULES   Nodule size <6 mm   Low-risk patients: No follow-up needed.   High-risk patients: Optional follow-up at 12 months.      BELKIS GROSS MD            SYSTEM ID:  [id number]        Results per radiology     EKG   ECG taken at 0901, ECG read at 0904  Normal sinus rhythm   Cannot rule Anterior infract, age undetermined   Abnormal ECG   Rate 60 bpm. RI interval 162 ms. QRS duration 90 ms. QT/QTc 454/454 ms. P-R-T axes 70 9 44.    Independent Interpretation  None     Medications Administered  Medications   nitroGLYcerin (NITROSTAT) sublingual tablet 0.4 mg  (0.4 mg Sublingual $Given 5/3/24 0935)   ketorolac (TORADOL) injection 15 mg (15 mg Intravenous $Given 5/3/24 0928)   famotidine (PEPCID) tablet 20 mg (20 mg Oral $Given 5/3/24 0940)   alum & mag hydroxide-simethicone (MAALOX) suspension 30 mL (30 mLs Oral $Given 5/3/24 0941)   iopamidol (ISOVUE-370) solution 72 mL (72 mLs Intravenous $Given 5/3/24 1046)   sodium chloride (PF) 0.9% PF flush 60 mL (60 mLs Intravenous $Given 5/3/24 1046)   acetaminophen (TYLENOL) tablet 1,000 mg (1,000 mg Oral $Given 5/3/24 1239)     Discussion of Management  None    Social Determinants of Health adding to complexity of care  None    ED Course  ED Course as of 05/03/24 1306   Fri May 03, 2024   0919 I obtained history and examined the patient as noted above.   1050  I reassessed the patient.  No new concerns.  She is noting some improvement.   1251 I rechecked the patient and explained findings. She states that she overall feels better, but still has some of her symptoms. I discussed plan for discharge home.     Medical Decision Making / Diagnosis       MDM  Marielena Ledesma is a 53 year old female with a history of headaches, hypertension, previous coronary artery vasospasm who presents emergency department with concerns for chest pain and abdominal pain as well as a headache.  On arrival she was hypertensive and remained so, however not concerning with hypertensive emergency despite the chest pain.  She has had multiple days of symptoms have been constant and despite this there is no convincing evidence of ischemia or injury to the heart.  I considered alternative etiologies including PE, aortic pathology, pulmonary pathology, or esophageal spasm/esophagitis given her description of the pain.  After comprehensive evaluation there is no clear cause for her symptoms.  I am not concerned about the head CT representing acute intracranial hemorrhage or mass and do not feel emergent imaging of the brain is needed.  She had improvement in  her symptoms over her stay with interventions.  She is still mildly symptomatic but felt comfortable to plan for discharge home.  She is recommended close follow-up with her primary care provider within 3 days given unclear nature of her symptoms and return precautions were discussed.  Using reasonable clinical judgment, there is not appear to be an acute life-threatening issue at this time however the patient understands that she should return should her symptoms worsen.  All questions were answered prior to discharge.    Disposition  The patient was discharged.     ICD-10 Codes:    ICD-10-CM    1. Chest pain, unspecified type  R07.9       2. Upper abdominal pain  R10.10       3. Hypertension, unspecified type  I10       4. Pulmonary nodules  R91.8       5. Nonintractable episodic headache, unspecified headache type  R51.9            Scribe Disclosure:  I, Humberto Sutton, am serving as a scribe at 10:19 AM on 5/3/2024 to document services personally performed by Galina Velazco MD based on my observations and the provider's statements to me.     [employer]      Galina Velazco MD  05/04/24 [id number]

## 2024-05-25 ENCOUNTER — HEALTH MAINTENANCE LETTER (OUTPATIENT)
Age: 54
End: 2024-05-25

## 2025-04-12 ENCOUNTER — HEALTH MAINTENANCE LETTER (OUTPATIENT)
Age: 55
End: 2025-04-12

## 2025-06-14 ENCOUNTER — HEALTH MAINTENANCE LETTER (OUTPATIENT)
Age: 55
End: 2025-06-14